# Patient Record
Sex: MALE | Race: WHITE | NOT HISPANIC OR LATINO | URBAN - METROPOLITAN AREA
[De-identification: names, ages, dates, MRNs, and addresses within clinical notes are randomized per-mention and may not be internally consistent; named-entity substitution may affect disease eponyms.]

---

## 2018-07-13 ENCOUNTER — EMERGENCY (EMERGENCY)
Facility: HOSPITAL | Age: 38
LOS: 1 days | Discharge: HOME | End: 2018-07-13
Attending: EMERGENCY MEDICINE
Payer: MEDICAID

## 2018-07-13 VITALS
HEART RATE: 115 BPM | TEMPERATURE: 99 F | DIASTOLIC BLOOD PRESSURE: 91 MMHG | OXYGEN SATURATION: 99 % | WEIGHT: 175.93 LBS | SYSTOLIC BLOOD PRESSURE: 157 MMHG | RESPIRATION RATE: 18 BRPM

## 2018-07-13 VITALS
OXYGEN SATURATION: 99 % | DIASTOLIC BLOOD PRESSURE: 75 MMHG | HEART RATE: 84 BPM | RESPIRATION RATE: 18 BRPM | SYSTOLIC BLOOD PRESSURE: 120 MMHG

## 2018-07-13 PROCEDURE — 12042 INTMD RPR N-HF/GENIT2.6-7.5: CPT

## 2018-07-13 PROCEDURE — 90471 IMMUNIZATION ADMIN: CPT

## 2018-07-13 PROCEDURE — 90715 TDAP VACCINE 7 YRS/> IM: CPT

## 2018-07-13 PROCEDURE — 99283 EMERGENCY DEPT VISIT LOW MDM: CPT | Mod: 25

## 2018-07-13 RX ORDER — ACETAMINOPHEN 500 MG
975 TABLET ORAL ONCE
Qty: 0 | Refills: 0 | Status: COMPLETED | OUTPATIENT
Start: 2018-07-13 | End: 2018-07-13

## 2018-07-13 RX ORDER — IBUPROFEN 200 MG
600 TABLET ORAL ONCE
Qty: 0 | Refills: 0 | Status: COMPLETED | OUTPATIENT
Start: 2018-07-13 | End: 2018-07-13

## 2018-07-13 RX ORDER — TETANUS TOXOID, REDUCED DIPHTHERIA TOXOID AND ACELLULAR PERTUSSIS VACCINE, ADSORBED 5; 2.5; 8; 8; 2.5 [IU]/.5ML; [IU]/.5ML; UG/.5ML; UG/.5ML; UG/.5ML
0.5 SUSPENSION INTRAMUSCULAR ONCE
Qty: 0 | Refills: 0 | Status: COMPLETED | OUTPATIENT
Start: 2018-07-13 | End: 2018-07-13

## 2018-07-13 RX ADMIN — Medication 600 MILLIGRAM(S): at 17:13

## 2018-07-13 RX ADMIN — Medication 975 MILLIGRAM(S): at 17:13

## 2018-07-13 RX ADMIN — TETANUS TOXOID, REDUCED DIPHTHERIA TOXOID AND ACELLULAR PERTUSSIS VACCINE, ADSORBED 0.5 MILLILITER(S): 5; 2.5; 8; 8; 2.5 SUSPENSION INTRAMUSCULAR at 17:13

## 2018-07-13 NOTE — ED PROVIDER NOTE - PHYSICAL EXAMINATION
NAD, VSS, Afebrile, No spinal tender, + Left hand dorsum, between 1st and 2nd metacarpal aspect vertical 6cm laceration, full ROMs of left hand/ fingers. N/V- intact. No arterial bleeding.

## 2018-07-13 NOTE — ED ADULT NURSE NOTE - OBJECTIVE STATEMENT
37 year old male presents to the ED complaining of left hand laceration, as per patient he was opening a box with a  and the knife slipped and he cut the lateral side of the left hand, 6cm actively bleeding while in ED, Pt has gauze over wound. Pt denies feeling dizzy or light headed. Pt is A&O x 4, VSS, afebrile, ambulating independently. Pt denies fever, chills, NVD, SOB, or chest pain.

## 2018-07-13 NOTE — ED PROVIDER NOTE - MEDICAL DECISION MAKING DETAILS
LI Gage MD: Pt is a 38 y/o male pt with no significant PMHx c/o left non dominant hand laceration s/p injury today with a  at work, + bleeding. Denies focal numbness/weakness. Denies other injuries. Denies sensory changes or weakness to extremities. Denies CP/SOB/ABD pain or N/V/D. Unknown TD. On exam, +bleeding to extremity requiring application of BP tourniquet and local lido with epi for attempted hemostasis. After bleeding controlled, wound explored and no obvious tendon injury identified. Pt also neurovascularly intact with intact tendons per motor exam and intact sensation pre and post procedure. Pressure dressing applied after laceration repair to help with hemostasis. Recommend f/u with Hand surgery within 7 days to ensure that pt truly has no tendon injury or nerve injury and f/u in ED in 10-14 days for suture removal. LI Gage MD: Pt is a 36 y/o male pt with no significant PMHx c/o left non dominant hand laceration s/p injury today with a  at work, + bleeding. Denies focal numbness/weakness. Denies other injuries. Denies sensory changes or weakness to extremities. Denies CP/SOB/ABD pain or N/V/D. Unknown TD. On exam, +bleeding to extremity requiring application of BP tourniquet and local lido with epi for attempted hemostasis. After bleeding controlled, wound explored and no obvious tendon injury identified. Pt also neurovascularly intact with intact tendons per motor exam and intact sensation pre and post procedure. Pressure dressing applied after laceration repair to help with hemostasis. Wound care instructions and return precautions given. Tetanus updated. Recommend f/u with Hand surgery within 7 days to ensure that pt truly has no tendon injury or nerve injury and f/u in ED in 10-14 days for suture removal.

## 2018-07-13 NOTE — ED PROVIDER NOTE - OBJECTIVE STATEMENT
38yo male pt, no significant PMHx, ambulatory c/o left non dominant hand laceration s/p injury today. Pt stated he cut left hand with a  at work. Denies other injuries. Denies sensory changes or weakness to extremities. Denies CP/SOB/ABD pain or N/V/D. Unknown TD.

## 2018-07-27 ENCOUNTER — EMERGENCY (EMERGENCY)
Facility: HOSPITAL | Age: 38
LOS: 1 days | Discharge: HOME | End: 2018-07-27
Attending: EMERGENCY MEDICINE
Payer: MEDICAID

## 2018-07-27 VITALS
DIASTOLIC BLOOD PRESSURE: 75 MMHG | HEART RATE: 79 BPM | RESPIRATION RATE: 16 BRPM | OXYGEN SATURATION: 99 % | TEMPERATURE: 98 F | SYSTOLIC BLOOD PRESSURE: 130 MMHG

## 2018-07-27 PROCEDURE — G0463: CPT

## 2018-07-27 NOTE — ED PROVIDER NOTE - PHYSICAL EXAMINATION
NAD, VSS, Afebrile, + Left hand dorsum, 1st metacarpal area, 12 stitches with well healing wound, non tender, swelling or caleb/ cellulitis. No discharge from wound. Full ROMs of the affected hand.

## 2018-07-27 NOTE — ED ADULT NURSE NOTE - CHPI ED SYMPTOMS NEG
no purulent drainage/no drainage/no bleeding at site/no pain/no fever/no inflammation/no redness/no red streaks/no bleeding/no chills

## 2018-07-27 NOTE — ED PROVIDER NOTE - OBJECTIVE STATEMENT
36yo male pt, no PMHx, ambulatory c/o left hand suture removal s/p primary closure on 7/13/18 after a laceration with a . His hand sled and cut left dominant hand. Denies fever, chills or bodyache. Denies sensory changes or weakness to extremities.

## 2018-07-27 NOTE — ED PROVIDER NOTE - ATTENDING CONTRIBUTION TO CARE
36yo male pt, no PMHx, ambulatory c/o left hand suture removal with no signs of infection noted, vss, sutures removed.

## 2023-03-06 NOTE — ED PROVIDER NOTE - NS ED ATTENDING STATEMENT MOD
I have personally performed a face to face diagnostic evaluation on this patient. I have reviewed the ACP note and agree with the history, exam and plan of care, except as noted. yes

## 2024-07-24 NOTE — ED ADULT NURSE NOTE - [id number]_ED_A_ED
From: Alexandr Reina  To: Naty Panchal  Sent: 7/23/2024 8:20 PM EDT  Subject: Call in steroid     Naty.     I have started getting the same symptoms of gout in the same foot again. I am getting ready to go on vacation and was hoping to get another steroid pack before I go.   
Pt notified  
Steroid pack sent in.  Avoid alcohol, caffeine.  Increase water. Eat less animal based products.  Avoid shellfish  
No